# Patient Record
Sex: MALE | Race: WHITE | NOT HISPANIC OR LATINO
[De-identification: names, ages, dates, MRNs, and addresses within clinical notes are randomized per-mention and may not be internally consistent; named-entity substitution may affect disease eponyms.]

---

## 2017-09-19 ENCOUNTER — HOSPITAL (OUTPATIENT)
Dept: OTHER | Age: 52
End: 2017-09-19

## 2017-11-30 ENCOUNTER — TELEPHONE (OUTPATIENT)
Dept: INTERNAL MEDICINE CLINIC | Facility: CLINIC | Age: 52
End: 2017-11-30

## 2023-12-12 ENCOUNTER — HOSPITAL ENCOUNTER (OUTPATIENT)
Dept: GENERAL RADIOLOGY | Facility: HOSPITAL | Age: 58
Discharge: HOME | End: 2023-12-12
Attending: PREVENTIVE MEDICINE
Payer: COMMERCIAL

## 2023-12-12 ENCOUNTER — OFFICE VISIT (OUTPATIENT)
Dept: OTHER | Facility: HOSPITAL | Age: 58
End: 2023-12-12
Attending: PREVENTIVE MEDICINE
Payer: COMMERCIAL

## 2023-12-12 DIAGNOSIS — S83.91XA RIGHT KNEE SPRAIN: ICD-10-CM

## 2023-12-12 DIAGNOSIS — S83.91XA RIGHT KNEE SPRAIN: Primary | ICD-10-CM

## 2023-12-12 PROCEDURE — 73562 X-RAY EXAM OF KNEE 3: CPT | Performed by: PREVENTIVE MEDICINE

## 2023-12-20 ENCOUNTER — TELEPHONE (OUTPATIENT)
Dept: ORTHOPEDICS CLINIC | Facility: CLINIC | Age: 58
End: 2023-12-20

## 2023-12-20 DIAGNOSIS — M25.561 RIGHT KNEE PAIN, UNSPECIFIED CHRONICITY: Primary | ICD-10-CM

## 2023-12-20 DIAGNOSIS — Z01.89 ENCOUNTER FOR LOWER EXTREMITY COMPARISON IMAGING STUDY: ICD-10-CM

## 2023-12-20 NOTE — TELEPHONE ENCOUNTER
Patient called for right knee sprain. Xrays and MRI in epic.  Please advise if additional is gonna be needed  Future Appointments   Date Time Provider Fernando Giordano   1/2/2024  9:30 AM JOSE Walsh [facility] [id number]

## 2024-01-01 ENCOUNTER — MED REC SCAN ONLY (OUTPATIENT)
Dept: ORTHOPEDICS CLINIC | Facility: CLINIC | Age: 59
End: 2024-01-01

## 2024-01-02 ENCOUNTER — HOSPITAL ENCOUNTER (OUTPATIENT)
Dept: GENERAL RADIOLOGY | Age: 59
Discharge: HOME | End: 2024-01-02
Attending: PHYSICIAN ASSISTANT
Payer: OTHER MISCELLANEOUS

## 2024-01-02 ENCOUNTER — TELEPHONE (OUTPATIENT)
Dept: ORTHOPEDICS CLINIC | Facility: CLINIC | Age: 59
End: 2024-01-02

## 2024-01-02 ENCOUNTER — OFFICE VISIT (OUTPATIENT)
Dept: ORTHOPEDICS CLINIC | Facility: CLINIC | Age: 59
End: 2024-01-02
Payer: OTHER MISCELLANEOUS

## 2024-01-02 VITALS — BODY MASS INDEX: 33.86 KG/M2 | WEIGHT: 250 LBS | HEIGHT: 72 IN

## 2024-01-02 DIAGNOSIS — S83.281A OTHER TEAR OF LATERAL MENISCUS OF RIGHT KNEE AS CURRENT INJURY, INITIAL ENCOUNTER: ICD-10-CM

## 2024-01-02 DIAGNOSIS — Z01.89 ENCOUNTER FOR LOWER EXTREMITY COMPARISON IMAGING STUDY: ICD-10-CM

## 2024-01-02 DIAGNOSIS — S83.241A OTHER TEAR OF MEDIAL MENISCUS OF RIGHT KNEE AS CURRENT INJURY, INITIAL ENCOUNTER: Primary | ICD-10-CM

## 2024-01-02 DIAGNOSIS — M25.561 RIGHT KNEE PAIN, UNSPECIFIED CHRONICITY: ICD-10-CM

## 2024-01-02 PROCEDURE — 99203 OFFICE O/P NEW LOW 30 MIN: CPT | Performed by: PHYSICIAN ASSISTANT

## 2024-01-02 PROCEDURE — 3008F BODY MASS INDEX DOCD: CPT | Performed by: PHYSICIAN ASSISTANT

## 2024-01-02 PROCEDURE — 20610 DRAIN/INJ JOINT/BURSA W/O US: CPT | Performed by: PHYSICIAN ASSISTANT

## 2024-01-02 RX ORDER — KETOROLAC TROMETHAMINE 30 MG/ML
30 INJECTION, SOLUTION INTRAMUSCULAR; INTRAVENOUS ONCE
Status: COMPLETED | OUTPATIENT
Start: 2024-01-02 | End: 2024-01-02

## 2024-01-02 RX ORDER — TRIAMCINOLONE ACETONIDE 40 MG/ML
40 INJECTION, SUSPENSION INTRA-ARTICULAR; INTRAMUSCULAR ONCE
Status: COMPLETED | OUTPATIENT
Start: 2024-01-02 | End: 2024-01-02

## 2024-01-02 RX ADMIN — TRIAMCINOLONE ACETONIDE 40 MG: 40 INJECTION, SUSPENSION INTRA-ARTICULAR; INTRAMUSCULAR at 09:45:00

## 2024-01-02 RX ADMIN — KETOROLAC TROMETHAMINE 30 MG: 30 INJECTION, SOLUTION INTRAMUSCULAR; INTRAVENOUS at 09:45:00

## 2024-01-02 NOTE — TELEPHONE ENCOUNTER
Patient saw Chir Stef today and is wanting to know if his work status letter can be be revised. He stated if his lifting limit can be increased to more than 5 pounds if possible. He was thinking maybe 40-50, if that's not to heavy.

## 2024-01-02 NOTE — PROCEDURES
Right Knee Intra-articular Injection    Name: Cruz Otto   MRN: [id number]  Date: 1/2/2024     Clinical Indications:   Meniscus Tear with symptoms refractory to conservative measures.     After informed consent, the injection site was marked, sterilized with topical chlorhexidine antiseptic, and locally anesthetized with skin refrigerant.    The patient was situation in a comfortable position. Using sterile technique: 1 mL of 30mg/mL of Ketorolac, 2 mL of 0.5% Bupivicaine, 2 mL of 1% Lidocaine, and 1 mL of 40 mg/ml Triamcinolone was injected utilizing anterolateral approach with a 21 gauge needle.  A band-aid was applied.  The patient tolerated the procedure well.    Disposition:   4 weeks, or as needed.             Sagrario Finch, [facility], PA-C Orthopedic Surgery / Sports Medicine Specialist  [facility]  [address] [place] [address].org  [email or web address]  t: [phone]  o: [phone]  f: [phone]          This note was dictated using Dragon software.  While it was briefly proofread prior to completion, some grammatical, spelling, and word choice errors due to dictation may still occur.

## 2024-01-02 NOTE — TELEPHONE ENCOUNTER
You saw pt this am and wrote a work letter with 5 lbs lifting weight limit.  He requesting that be raise to 40-50 lbs weight restriction.  Letter pended for you to review/revise as needed.  Thank you!

## 2024-01-02 NOTE — H&P
[facility] - ORTHOPEDICS  [address] [place] [address]  [phone]     NEW PATIENT VISIT - HISTORY AND PHYSICAL EXAMINATION     Name: Cruz Otto   MRN: [id number]  Date: 1/2/2024     CC: Right knee pain.     REFERRED BY: No primary care provider on file.    HPI:   Cruz Otto is a very pleasant 58 year old male who presents today for evaluation, consultation, and management of RIGHT knee pain, after a work injury on 12/4/2023. He works for [employer] as a [employer], in this position for 7 years. No hx of work injury, or right knee injury.     He describes being on a ladder with increased pressure on the right knee, with his knee abduction. The next day he developed significant swelling, pain with walking, stiffness. He went to the ER on 12/12/2023, and was referred for an MRI and ortho evaluation. He presents today for evaluation. Pain is a 2/10.       PMH:   Past Medical History:   Diagnosis Date    Closed fracture of C1-C4 level with unspecified spinal cord injury 1971       PAST SURGICAL HX:  Past Surgical History:   Procedure Laterality Date    BACK SURGERY  1971    cervical spine d/t  fracture    OTHER SURGICAL HISTORY  Nov 2012    slipped disc       FAMILY HX:  Family History   Problem Relation Age of Onset    Hypertension Father     Other (acute MI[other]) Father 40    Other (dyslipidemia[other]) Father     Other (stroke syndrome[other]) Paternal Grandfather        ALLERGIES:  Patient has no known allergies.    MEDICATIONS:   No current outpatient medications on file.       ROS: A comprehensive 14 point review of systems was performed and was negative aside from the aforementioned per history of present illness.    SOCIAL HX:  Social History     Occupational History    Not on file   Tobacco Use    Smoking status: Former     Types: Cigarettes    Smokeless tobacco: Current     Types: Chew    Tobacco comments:     18 YEARS AGO   Substance and Sexual  Activity    Alcohol use: Not on file     Comment: 1x a week    Drug use: No    Sexual activity: Not on file       PE:   Vitals:    01/02/24 0840   Weight: 250 lb (113.4 kg)   Height: 6' (1.829 m)     Estimated body mass index is 33.91 kg/m² as calculated from the following:    Height as of this encounter: 6' (1.829 m).    Weight as of this encounter: 250 lb (113.4 kg).    Physical Exam  Constitutional:       Appearance: Normal appearance.   HENT:      Head: Normocephalic and atraumatic.   Eyes:      Extraocular Movements: Extraocular movements intact.   Neck:      Musculoskeletal: Normal range of motion and neck supple.   Cardiovascular:      Pulses: Normal pulses.   Pulmonary:      Effort: Pulmonary effort is normal. No respiratory distress.   Abdominal:      General: There is no distension.   Skin:     General: Skin is warm.      Capillary Refill: Capillary refill takes less than 2 seconds.      Findings: No bruising.   Neurological:      General: No focal deficit present.      Mental Status: Alert.   Psychiatric:         Mood and Affect: Mood normal.     Examination of the right knee demonstrates:     Skin is intact, warm and dry.   Atrophy: none    Effusion: small    Joint line tenderness: none  Crepitation: none   Macario: Positive   Patellar mobility: normal without apprehension  J-sign: none    ROM: Extension full  Flexion 120 degrees  ACL:  Negative Lachman, Negative Pivot Shift   PCL:  Negative Posterior Drawer  Collateral Ligaments: Stable to Varus and Valgus stress at 0 and 30 degrees  Strength: normal   Hip joint: normal pain-free ROM   Gait:  mildly antalgic   Leg length: equal and symmetric  Alignment:  neutral     No obvious peripheral edema noted.   Distal neurovascular exam demonstrates normal perfusion, intact sensation to light touch and full strength.     Examination of the contralateral knee demonstrates:  No significant atrophy, swelling or effusion. Full range of motion. Neurovascularly  intact distally.    Radiographic Examination/Diagnostics:  I personally viewed, independently interpreted and radiology report was reviewed.      XR KNEE (3 VIEWS), RIGHT (CPT=73562)    Result Date: 12/12/2023  PROCEDURE:  XR KNEE ROUTINE (3 VIEWS), RIGHT (CPT=73562)  TECHNIQUE:  Three views were obtained including patellar view.  COMPARISON:  None.  INDICATIONS:  PATIENT STATED HISTORY: (As transcribed by Technologist)  Patient states he turned the wrong way while on a ladder and injured his right knee. He states swelling, pain on medial and anterior side of right knee. Injury occurred on 12/04.     FINDINGS:  BONES:  Mild medial and patellofemoral compartment narrowing with hypertrophic spurring.  No acute fracture or dislocation. SOFT TISSUES:  Moderate suprapatellar effusion.            CONCLUSION:  Moderate suprapatellar effusion.  No acute fracture or dislocation.   LOCATION:  [id number]   Dictated by (CST): Charlene Núñez MD on 12/12/2023 at 5:09 PM     Finalized by (CST): Charlene Núñez MD on 12/12/2023 at 5:10 PM         IMPRESSION: Cruz Otto is a 58 year old male who presents with right knee pain consistent with medial, lateral meniscus tears and small effusion.     PLAN:   We had a detailed discussion outlining the etiology, anatomy, pathophysiology, and natural history of the patient's findings. Imaging was reviewed in detail and correlated to a 3-dimensional model of the patient's pathology.     We reviewed the treatment of this disease condition.  We recommended physical therapy to aid in strengthening, range of motion, functional improvement, and return to baseline activity.      After a discussion of a variety of conservative treatment options we elected to proceed with the injection procedure at today's visit. We discussed the risk and benefits of the procedure, including, but not limited to: infection, injury to blood vessels, nerve injury, prolonged pain, swelling, site soreness, failure to  progress, and need for advanced treatments.     We provided a hinged knee brace today. Continue to work light duty.     If not improved in four weeks we will plan for right knee arthroscopic meniscus debridements.     The patient had the opportunity to ask questions and all questions were answered appropriately.      FOLLOW-UP:  Return to clinic in four weeks. No imaging required at next visit.             Sincer JOCELIN An, PA-C Orthopedic Surgery / Sports Medicine Specialist  [facility]  [address] [place] [address].org  [email or web address]  t: [phone]  o: [phone]  f: [phone]    This note was dictated using Dragon software.  While it was briefly proofread prior to completion, some grammatical, spelling, and word choice errors due to dictation may still occur.

## 2024-02-05 ENCOUNTER — MED REC SCAN ONLY (OUTPATIENT)
Dept: ORTHOPEDICS CLINIC | Facility: CLINIC | Age: 59
End: 2024-02-05

## 2024-02-05 ENCOUNTER — OFFICE VISIT (OUTPATIENT)
Dept: ORTHOPEDICS CLINIC | Facility: CLINIC | Age: 59
End: 2024-02-05
Payer: OTHER MISCELLANEOUS

## 2024-02-05 DIAGNOSIS — S83.281D OTHER TEAR OF LATERAL MENISCUS OF RIGHT KNEE AS CURRENT INJURY, SUBSEQUENT ENCOUNTER: ICD-10-CM

## 2024-02-05 DIAGNOSIS — S83.241D OTHER TEAR OF MEDIAL MENISCUS OF RIGHT KNEE AS CURRENT INJURY, SUBSEQUENT ENCOUNTER: Primary | ICD-10-CM

## 2024-02-05 NOTE — PROGRESS NOTES
[facility] - ORTHOPEDICS  [address] [place] [address]  [phone]       Name: Cruz Otto   MRN: [id number]  Date: 2/5/2024     REASON FOR VISIT: Follow up for right knee pain consistent with medial, lateral meniscus tears and small effusion.      INTERVAL HISTORY:  Cruz Otto is a 58 year old male who returns for evaluation of right knee pain consistent with medial, lateral meniscus tears and small effusion.      To summarize,  RIGHT knee pain, after a work injury on 12/4/2023. He works for [employer] as a , in this position for 7 years. No hx of work injury, or right knee injury.      He describes being on a ladder with increased pressure on the right knee, with his knee abduction. The next day he developed significant swelling, pain with walking, stiffness. He went to the ER on 12/12/2023, and was referred for an MRI and ortho evaluation. He presents today for evaluation. Today, he notes improvement since his injection 1/2/2024.       ROS: ROS    PE:   There were no vitals filed for this visit.  Estimated body mass index is 33.91 kg/m² as calculated from the following:    Height as of 1/2/24: 6' (1.829 m).    Weight as of 1/2/24: 250 lb (113.4 kg).    Physical Exam  Constitutional:       Appearance: Normal appearance.   HENT:      Head: Normocephalic and atraumatic.   Eyes:      Extraocular Movements: Extraocular movements intact.   Neck:      Musculoskeletal: Normal range of motion and neck supple.   Cardiovascular:      Pulses: Normal pulses.   Pulmonary:      Effort: Pulmonary effort is normal. No respiratory distress.   Abdominal:      General: There is no distension.   Skin:     General: Skin is warm.      Capillary Refill: Capillary refill takes less than 2 seconds.      Findings: No bruising.   Neurological:      General: No focal deficit present.      Mental Status: She is alert.   Psychiatric:         Mood and Affect: Mood normal.      Examination of the right knee demonstrates:     Skin is intact, warm and dry.   Atrophy: none    Effusion: none    Joint line tenderness: medial  Crepitation: none   Macario: Positive   Patellar mobility: normal without apprehension  J-sign: none    ROM: Extension full  Flexion 120 degrees  ACL:  Negative Lachman, Negative Pivot Shift   PCL:  Negative Posterior Drawer  Collateral Ligaments: Stable to Varus and Valgus stress at 0 and 30 degrees  Strength: mild weakness   Hip joint: normal pain-free ROM   Gait:  mildly antalgic   Leg length: equal and symmetric  Alignment:  neutral     No obvious peripheral edema noted.   Distal neurovascular exam demonstrates normal perfusion, intact sensation to light touch and full strength.     Examination of the contralateral knee demonstrates:  No significant atrophy, swelling or effusion. Full range of motion. Neurovascularly intact distally.      Radiographic Examination/Diagnostics:    I personally viewed, independently interpreted and radiology report was reviewed.    XR KNEE (3 VIEWS), RIGHT (CPT=73562)     Result Date: 12/12/2023  PROCEDURE:  XR KNEE ROUTINE (3 VIEWS), RIGHT (CPT=73562)  TECHNIQUE:  Three views were obtained including patellar view.  COMPARISON:  None.  INDICATIONS:  PATIENT STATED HISTORY: (As transcribed by Technologist)  Patient states he turned the wrong way while on a ladder and injured his right knee. He states swelling, pain on medial and anterior side of right knee. Injury occurred on 12/04.     FINDINGS:  BONES:  Mild medial and patellofemoral compartment narrowing with hypertrophic spurring.  No acute fracture or dislocation. SOFT TISSUES:  Moderate suprapatellar effusion.             CONCLUSION:  Moderate suprapatellar effusion.  No acute fracture or dislocation.   LOCATION:  [id number]   Dictated by (CST): Charlene Núñez MD on 12/12/2023 at 5:09 PM     Finalized by (CST): Charlene Núñez MD on 12/12/2023 at 5:10 PM          IMPRESSION: Cruz  LASHAWN Otto is a 58 year old male who presented for follow up of right knee pain consistent with medial, lateral meniscus tears and small effusion.      PLAN:   We had a detailed discussion outlining the etiology, anatomy, pathophysiology, and natural history of the patient's findings.    We reviewed the treatment of this disease condition.  Continue light duty for four weeks, continue PT. Trial of full duty work.      The patient had opportunity to ask questions and all questions were answered appropriately.    FOLLOW-UP:  8 weeks or as needed.             Sagrario Finch [facility], PA-C Orthopedic Surgery / Sports Medicine Specialist  [facility] Orthopaedic Surgery  [address] [place] [address].org  [email or web address]  t: [phone]  o: [phone]  f: [phone]    This note was dictated using Dragon software.  While it was briefly proofread prior to completion, some grammatical, spelling, and word choice errors due to dictation may still occur.

## 2024-02-21 ENCOUNTER — MED REC SCAN ONLY (OUTPATIENT)
Dept: ORTHOPEDICS CLINIC | Facility: CLINIC | Age: 59
End: 2024-02-21

## 2024-04-05 ENCOUNTER — OFFICE VISIT (OUTPATIENT)
Dept: ORTHOPEDICS CLINIC | Facility: CLINIC | Age: 59
End: 2024-04-05
Payer: OTHER MISCELLANEOUS

## 2024-04-05 DIAGNOSIS — S83.241D OTHER TEAR OF MEDIAL MENISCUS OF RIGHT KNEE AS CURRENT INJURY, SUBSEQUENT ENCOUNTER: Primary | ICD-10-CM

## 2024-04-05 NOTE — PROGRESS NOTES
[facility] - ORTHOPEDICS  [address] [place] [address]  [phone]       Name: Cruz Otto   MRN: [id number]  Date: 4/5/2024     REASON FOR VISIT: Follow up for right knee pain consistent with medial, lateral meniscus tears and small effusion.      INTERVAL HISTORY:  Cruz Otto is a 58 year old male who returns for evaluation of right knee pain consistent with medial, lateral meniscus tears and small effusion.      To summarize,  RIGHT knee pain, after a work injury on 12/4/2023. He works for [employer] as a , in this position for 7 years. No hx of work injury, or right knee injury.      He describes being on a ladder with increased pressure on the right knee, with his knee abduction. The next day he developed significant swelling, pain with walking, stiffness. He went to the ER on 12/12/2023, and was referred for an MRI and ortho evaluation. He presents today for evaluation.     Today, he notes improvement since his injection 1/2/2024.     ROS: ROS    PE:   There were no vitals filed for this visit.  Estimated body mass index is 33.91 kg/m² as calculated from the following:    Height as of 1/2/24: 6' (1.829 m).    Weight as of 1/2/24: 250 lb (113.4 kg).    Physical Exam  Constitutional:       Appearance: Normal appearance.   HENT:      Head: Normocephalic and atraumatic.   Eyes:      Extraocular Movements: Extraocular movements intact.   Neck:      Musculoskeletal: Normal range of motion and neck supple.   Cardiovascular:      Pulses: Normal pulses.   Pulmonary:      Effort: Pulmonary effort is normal. No respiratory distress.   Abdominal:      General: There is no distension.   Skin:     General: Skin is warm.      Capillary Refill: Capillary refill takes less than 2 seconds.      Findings: No bruising.   Neurological:      General: No focal deficit present.      Mental Status: She is alert.   Psychiatric:         Mood and Affect: Mood  normal.     Examination of the right knee demonstrates:     Skin is intact, warm and dry.   Atrophy: none    Effusion: none    Joint line tenderness: none  Crepitation: none   Macario: Negative   Patellar mobility: normal without apprehension  J-sign: none    ROM: Extension full  Flexion 140 degrees  ACL:  Negative Lachman, Negative Pivot Shift   PCL:  Negative Posterior Drawer  Collateral Ligaments: Stable to Varus and Valgus stress at 0 and 30 degrees  Strength: normal   Hip joint: normal pain-free ROM   Gait:  normal   Leg length: equal and symmetric  Alignment:  neutral     No obvious peripheral edema noted.   Distal neurovascular exam demonstrates normal perfusion, intact sensation to light touch and full strength.     IMPRESSION: Cruz Otto is a 58 year old male who presented for follow up of right knee pain consistent with medial, lateral meniscus tears and small effusion- resolved.     PLAN:   We had a detailed discussion outlining the etiology, anatomy, pathophysiology, and natural history of the patient's findings.    The patient notes near complete resolution of symptoms, and return to full baseline function. The patient can follow up with our office as needed. The patient had the opportunity to ask questions, and all questions were answered appropriately.    The patient had opportunity to ask questions and all questions were answered appropriately.    FOLLOW-UP:  As needed.         Sagrario Finch [facility], PA-C Orthopedic Surgery / Sports Medicine Specialist  [facility]  [address] [place] [address].org  [email or web address]  t: [phone]  o: [phone]  f: [phone]    This note was dictated using Dragon software.  While it was briefly proofread prior to completion, some grammatical, spelling, and word choice errors due to dictation may still occur.

## 2025-02-03 ENCOUNTER — HOSPITAL ENCOUNTER (OUTPATIENT)
Dept: GENERAL RADIOLOGY | Age: 60
Discharge: HOME | End: 2025-02-03
Attending: PHYSICIAN ASSISTANT
Payer: COMMERCIAL

## 2025-02-03 ENCOUNTER — OFFICE VISIT (OUTPATIENT)
Dept: ORTHOPEDICS CLINIC | Facility: CLINIC | Age: 60
End: 2025-02-03
Payer: COMMERCIAL

## 2025-02-03 ENCOUNTER — TELEPHONE (OUTPATIENT)
Facility: CLINIC | Age: 60
End: 2025-02-03

## 2025-02-03 VITALS — BODY MASS INDEX: 33.86 KG/M2 | WEIGHT: 250 LBS | HEIGHT: 72 IN

## 2025-02-03 DIAGNOSIS — S89.92XA LEFT KNEE INJURY, INITIAL ENCOUNTER: Primary | ICD-10-CM

## 2025-02-03 DIAGNOSIS — M25.462 KNEE EFFUSION, LEFT: Primary | ICD-10-CM

## 2025-02-03 DIAGNOSIS — S89.92XA LEFT KNEE INJURY, INITIAL ENCOUNTER: ICD-10-CM

## 2025-02-03 DIAGNOSIS — M23.92 LOCKED KNEE, LEFT: ICD-10-CM

## 2025-02-03 PROCEDURE — 73564 X-RAY EXAM KNEE 4 OR MORE: CPT | Performed by: PHYSICIAN ASSISTANT

## 2025-02-03 PROCEDURE — 3008F BODY MASS INDEX DOCD: CPT | Performed by: PHYSICIAN ASSISTANT

## 2025-02-03 PROCEDURE — 99214 OFFICE O/P EST MOD 30 MIN: CPT | Performed by: PHYSICIAN ASSISTANT

## 2025-02-03 RX ORDER — CELECOXIB 100 MG/1
100 CAPSULE ORAL 2 TIMES DAILY
Qty: 28 CAPSULE | Refills: 0 | Status: SHIPPED | OUTPATIENT
Start: 2025-02-03 | End: 2025-02-17

## 2025-02-03 NOTE — PROGRESS NOTES
[facility] - ORTHOPEDICS  [address] [place] [address]  [phone]       Name: Cruz Otto   MRN: [id number]  Date: 2/3/2025     REASON FOR VISIT: Follow up for left knee pain.     INTERVAL HISTORY:  Cruz Otto is a 59 year old male who returns for evaluation of  left knee pain. This has been ongoing since 1/31/2025- while getting off a chair at work. He doesn't recall an injury or fall. He describes 6/10 pain.     To summarize, previously seen for right knee pain consistent with medial, lateral meniscus tears and small effusion.      ROS: ROS    PE:   Vitals:    02/03/25 1330   Weight: 250 lb (113.4 kg)   Height: 6' (1.829 m)     Estimated body mass index is 33.91 kg/m² as calculated from the following:    Height as of this encounter: 6' (1.829 m).    Weight as of this encounter: 250 lb (113.4 kg).    Physical Exam  Constitutional:       Appearance: Normal appearance.   HENT:      Head: Normocephalic and atraumatic.   Eyes:      Extraocular Movements: Extraocular movements intact.   Neck:      Musculoskeletal: Normal range of motion and neck supple.   Cardiovascular:      Pulses: Normal pulses.   Pulmonary:      Effort: Pulmonary effort is normal. No respiratory distress.   Abdominal:      General: There is no distension.   Skin:     General: Skin is warm.      Capillary Refill: Capillary refill takes less than 2 seconds.      Findings: No bruising.   Neurological:      General: No focal deficit present.      Mental Status: She is alert.   Psychiatric:         Mood and Affect: Mood normal.     Examination of the left knee demonstrates:     Skin is intact, warm and dry.   Atrophy: none    Effusion: large    Joint line tenderness: none  Crepitation: none   Macario: Positive   Patellar mobility: normal without apprehension  J-sign: none    ROM: Extension full  Flexion 90 degrees  ACL:  guarded   PCL:  Negative Posterior Drawer  Collateral Ligaments: Stable to Varus  and Valgus stress at 0 and 30 degrees  Strength: normal   Hip joint: normal pain-free ROM   Gait:  significantly antalgic   Leg length: equal and symmetric  Alignment:  neutral     No obvious peripheral edema noted.   Distal neurovascular exam demonstrates normal perfusion, intact sensation to light touch and full strength.     Examination of the contralateral knee demonstrates:  No significant atrophy, swelling or effusion. Full range of motion. Neurovascularly intact distally.      Radiographic Examination/Diagnostics:    I personally viewed, independently interpreted and radiology report was reviewed.    X-ray, Left Knee, 2/3/2025- no evidence of fracture, foreign body or soft tissue injury.     IMPRESSION: Cruz Otto is a 59 year old male who presented for follow up of left knee pain concerning for acute internal deranagement.     PLAN:   We had a detailed discussion outlining the etiology, anatomy, pathophysiology, and natural history of the patient's findings.    We reviewed the treatment of this disease condition.      In light of the chronicity of symptoms, loss of normal function, and  failure to progress conservatively we recommend an MRI to evaluate the integrity of the patient's left knee rule out internal derangement. The patient will follow up after imaging.   Differential diagnosis includes but not limited to: cartilage injury/loose body, meniscus tear/injury, ACL tear, bone marrow edema, and osteoarthritis.     External records were also reviewed for pertinent historical findings contributing to the patients undiagnosed new problem with uncertain prognosis.     Differential also includes gout.     The patient had opportunity to ask questions and all questions were answered appropriately.    FOLLOW-UP:  Return to clinic following completion of MRI to review scan and findings.             Sagrario Finch, JOCELIN, PA-C Orthopedic Surgery / Sports Medicine Specialist  [facility]  [address] [place] [address]   [email or web address]  [email or web address]  t: [phone]  o: [phone]  f: [phone]    This note was dictated using Dragon software.  While it was briefly proofread prior to completion, some grammatical, spelling, and word choice errors due to dictation may still occur.

## (undated) NOTE — LETTER
Date: 4/5/2024    Patient Name: Cruz Otto      To Whom it may concern:    This letter has been written at the patient's request. The above patient was seen at [facility] for treatment of a medical condition.    The patient can return to work without restrictions, follow up as needed.       Sincerely,          Sincer JOCELIN An, PA-C Orthopedic Surgery / Sports Medicine Specialist  [facility]edic Surgery  [address] [place] [address].org  [email or web address]  t: [phone]  o: [phone]  f: [phone]    This note was dictated using Dragon software.  While it was briefly proofread prior to completion, some grammatical, spelling, and word choice errors due to dictation may still occur.

## (undated) NOTE — LETTER
Date: 1/2/2024    Patient Name: Cruz Otto          To Whom it may concern:    This letter has been written at the patient's request. The above patient was seen at the [facility] for treatment of a medical condition.    The patient can return to work no lifting greater than 5lbs, no squatting or ladders.       Sincerely,          Sincer MICK Finch [facility], PA-C Orthopedic Surgery / Sports Medicine Specialist  [facility]  [address] [place] [address].org  [email or web address]  t: [phone]  o: [phone]  f: [phone]    This note was dictated using Dragon software.  While it was briefly proofread prior to completion, some grammatical, spelling, and word choice errors due to dictation may still occur.

## (undated) NOTE — LETTER
Date: 2/5/2024    Patient Name: Cruz Otto          To Whom it may concern:    This letter has been written at the patient's request. The above patient was seen at the [facility] for treatment of a medical condition.    The patient can return to work, no lifting, pulling, or pushing greater than 25lbs for four weeks. Full duty work in four weeks without restrictions.       Sincerely,          Sincer JOCELIN An, PA-C Orthopedic Surgery / Sports Medicine Specialist  [facility]  [address] [place] [email or web address]  [email or web address]  t: [phone]  o: [phone]  f: [phone]    This note was dictated using Dragon software.  While it was briefly proofread prior to completion, some grammatical, spelling, and word choice errors due to dictation may still occur.

## (undated) NOTE — LETTER
Date: 1/2/2024    Patient Name: Cruz Otto          To Whom it may concern:    This letter has been written at the patient's request. The above patient was seen at the [facility] for treatment of a medical condition.      The patient can return to work no lifting greater than 40lbs, no squatting or ladders.              Sincerely,          Sincer MICK [facility], PA-C Orthopedic Surgery / Sports Medicine Specialist  [facility]  [address] [place] [email or web address]  [email or web address]  t: [phone]  o: [phone]  f: [phone]    This note was dictated using Dragon [email or web address]  While it was briefly proofread prior to completion, some grammatical, spelling, and word choice errors due to dictation may still occur.